# Patient Record
Sex: FEMALE | Race: WHITE
[De-identification: names, ages, dates, MRNs, and addresses within clinical notes are randomized per-mention and may not be internally consistent; named-entity substitution may affect disease eponyms.]

---

## 2017-01-03 ENCOUNTER — HOSPITAL ENCOUNTER (OUTPATIENT)
Age: 82
Discharge: HOME | End: 2017-01-03

## 2017-04-10 ENCOUNTER — HOSPITAL ENCOUNTER (OUTPATIENT)
Age: 82
Discharge: HOME | End: 2017-04-10
Payer: MEDICARE

## 2017-04-10 DIAGNOSIS — C50.911: Primary | ICD-10-CM

## 2018-02-04 ENCOUNTER — HOSPITAL ENCOUNTER (EMERGENCY)
Dept: HOSPITAL 76 - ED | Age: 83
Discharge: HOME | End: 2018-02-04
Payer: MEDICARE

## 2018-02-04 VITALS — SYSTOLIC BLOOD PRESSURE: 133 MMHG | DIASTOLIC BLOOD PRESSURE: 80 MMHG

## 2018-02-04 DIAGNOSIS — I10: ICD-10-CM

## 2018-02-04 DIAGNOSIS — M17.11: Primary | ICD-10-CM

## 2018-02-04 DIAGNOSIS — Z85.3: ICD-10-CM

## 2018-02-04 DIAGNOSIS — Z92.3: ICD-10-CM

## 2018-02-04 DIAGNOSIS — M19.042: ICD-10-CM

## 2018-02-04 PROCEDURE — 99283 EMERGENCY DEPT VISIT LOW MDM: CPT

## 2018-02-04 NOTE — XRAY PRELIMINARY REPORT
Accession: [id number]

Exam: XR HAND 3 VIEW LT

 

IMPRESSION: 

1. No evidence for acute fracture. 

2. First mild carpal metacarpal and minimal interphalangeal osteoarthritis.

 

[facility]

 

SITE ID: [id number]

## 2018-02-04 NOTE — XRAY PRELIMINARY REPORT
Accession: [id number]

Exam: XR KNEE 4 VIEW RT

 

IMPRESSION: 

1. No evidence for acute fracture. 

2. Moderate to severe tricompartment knee degenerative joint disease, most severe at the medial juventino
rtment femoral tibial joint.

 

[facility]

 

SITE ID: [id number]

## 2018-02-04 NOTE — XRAY REPORT
EXAM:

RIGHT KNEE RADIOGRAPHY

 

EXAM DATE: 2/4/2018 04:32 PM.

 

CLINICAL HISTORY: Knee pain. Right knee pain after fall yesterday.

 

COMPARISON: None.

 

TECHNIQUE: 4 views.

 

FINDINGS: 

Bones: No evidence for acute fracture.

 

Joints: Severe medial compartment femoral tibial joint space narrowing with sclerosis and osteophytes
. Moderate lateral compartment femoral tibial osteophytes. Moderate patellofemoral osteophytes. No di
slocation. No knee joint effusion.

 

Soft Tissues: Unremarkable. The femoral and popliteal artery calcification.

 

IMPRESSION: 

1. No evidence for acute fracture. 

2. Moderate to severe tricompartment knee degenerative joint disease, most severe at the medial juventino
rtment femoral tibial joint.

 

[facility]

Referring Provider Line: [phone]

 

SITE ID: [id number]

## 2018-02-04 NOTE — XRAY REPORT
EXAM:

LEFT HAND RADIOGRAPHY

 

EXAM DATE: 2/4/2018 04:32 PM.

 

CLINICAL HISTORY: Hand pain at CMC. Left hand pain, chronic, fell today. Pain at the first CMC joint.


 

COMPARISON: Left wrist 06/11/2009.

 

TECHNIQUE: 3 views.

 

FINDINGS: 

Bones: No evidence for acute fracture.

 

Joints: First mild carpal metacarpal and minimal interphalangeal osteoarthritis.

 

Soft Tissues: Unremarkable.

 

IMPRESSION: 

1. No evidence for acute fracture. 

2. First mild carpal metacarpal and minimal interphalangeal osteoarthritis.

 

[facility]

Referring Provider Line: [phone]

 

SITE ID: [id number]

## 2018-02-04 NOTE — ED PHYSICIAN DOCUMENTATION
PD HPI LOWER EXT INJURY





- Stated complaint


Stated Complaint: KNEE PAIN





- Chief complaint


Chief Complaint: Ext Problem





- History obtained from


History obtained from: Patient





- History of Present Illness


PD HPI LOW EXT INJURY LOCATION: Other (She has a long history of knee problems, 

had multiple surgeries but not for many years.  Her knees were doing okay but 

coming down the stairs today at [employer] she started to feel a pop and tightness 

in the knee and was having difficulty walking, she is pain-free at rest.  She 

also has ongoing pain at the left first CMC for which she is seeing an 

orthopedist in a few days but has not had it x-rayed.)





Review of Systems


Constitutional: reports: Reviewed and negative


Cardiac: reports: Reviewed and negative


Respiratory: reports: Reviewed and negative





PD PAST MEDICAL HISTORY





- Past Medical History


Cardiovascular: Hypertension, High cholesterol


Respiratory: None


Neuro: None


Endocrine/Autoimmune: None


GI: GERD


GYN: Endometriosis, Breast cancer (Status post right lumpectomy and 6 weeks of 

radiation therapy.)


: Frequency


HEENT: Glaucoma


Psych: None


Musculoskeletal: Osteoarthritis


Derm: 12





- Past Surgical History


Past Surgical History: Yes


General: Cholecystectomy, Appendectomy


/GYN: Hysterectomy, Oophrectomy


HEENT: Cataracts





- Present Medications


Home Medications: 


 Ambulatory Orders











 Medication  Instructions  Recorded  Confirmed


 


Amlodipine Besylate 10 mg PO DAILY 05/17/13 06/26/17


 


Calcium Carbonate/Vitamin D3 1 each PO DAILY 05/17/13 06/26/17





[Calcium 600 + D3 Softgel]   


 


Cholecalciferol (Vitamin D3) 1,000 unit PO DAILY 05/17/13 06/26/17





[Vitamin D]   


 


Dextran 70/Hypromellose 1 each EACHEYE DAILY PRN 05/17/13 06/26/17





[Artificial Tears]   


 


Hydrochlorothiazide 25 mg PO DAILY 05/17/13 06/26/17


 


Omeprazole [PriLOSEC] 10 mg PO DAILY 05/17/13 06/26/17


 


Aspirin [Aspirin EC] 650 mg ORAL DAILY 10/13/14 06/26/17


 


Potassium Chloride 10 meq ORAL DAILY 10/13/14 06/26/17


 


Timolol 0.25% Ophth Drops 1 drop EACHEYE BID 10/13/14 06/26/17





[Timoptic 0.25% Ophth Drops]   














- Allergies


Allergies/Adverse Reactions: 


 Allergies











Allergy/AdvReac Type Severity Reaction Status Date / Time


 


sulfamethoxazole Allergy Severe hypotension, Verified 02/04/18 15:45





[From Bactrim]   hives  


 


trimethoprim [From Bactrim] Allergy Severe hypotension, Verified 02/04/18 15:45





   hives  














- Social History


Does the pt smoke?: No


Smoking Status: Never smoker


Does the pt drink ETOH?: No


Does the pt have substance abuse?: No





- Immunizations


Immunizations are current?: Yes





- POLST


Patient has POLST: Yes


POLST Status: DNR





PD ED PE NORMAL





- Vitals


Vital signs reviewed: Yes





- General


General: Alert and oriented X 3, No acute distress





- Extremities


Extremities: Other (Left first CMC is mildly swollen and tender but were not 

warm or red.  The right knee has mild posterior tenderness in the popliteal 

fossa but ligamentous testing is intact, there is no calf pain or tenderness or 

swelling or asymmetry.)





- Neuro


Neuro: Alert and oriented X 3, Normal speech





Results





- Vitals


Vitals: 


 Vital Signs - 24 hr











  02/04/18





  15:44


 


Temperature 36.8 C


 


Heart Rate 97


 


Respiratory 20





Rate 


 


Blood Pressure 133/80 H


 


O2 Saturation 96








 Oxygen











O2 Source                      Room air

















- Rads (name of study)


  ** R knee 4v


Radiology: EMP read contemporaneously (severe OA)





  ** L hand 3v


Radiology: EMP read contemporaneously (OA, janes CMC)





PD MEDICAL DECISION MAKING





- ED course


ED course: 





82-year-old woman with history of knee problems presents with pain in the right 

knee that started while going downstairs today and has significant 

osteoarthritis especially the medial compartment on x-ray.  She declined pain 

medication.  She has an appointment with the orthopedist on Wednesday.





Departure





- Departure


Disposition: 01 Home, Self Care


Clinical Impression: 


 Primary osteoarthritis, left hand





Osteoarthritis of right knee


Qualifiers:


 Osteoarthritis type: primary Qualified Code(s): M17.11 - Unilateral primary 

osteoarthritis, right knee





Condition: Good


Record reviewed to determine appropriate education?: Yes


Instructions:  Osteoarthritis


Comments: 


Follow-up with the orthopedic surgeon on Wednesday as scheduled.  Return if 

worse.  Please let the orthopedic surgeon know that we did x-rays today.  

Tylenol as needed for pain.


Discharge Date/Time: 02/04/18 17:19

## 2018-04-16 ENCOUNTER — HOSPITAL ENCOUNTER (OUTPATIENT)
Dept: HOSPITAL 76 - DI | Age: 83
Discharge: HOME | End: 2018-04-16
Attending: INTERNAL MEDICINE
Payer: MEDICARE

## 2018-04-16 DIAGNOSIS — Z53.9: Primary | ICD-10-CM

## 2018-05-09 ENCOUNTER — HOSPITAL ENCOUNTER (OUTPATIENT)
Dept: HOSPITAL 76 - DI | Age: 83
Discharge: HOME | End: 2018-05-09
Attending: INTERNAL MEDICINE
Payer: MEDICARE

## 2018-05-09 DIAGNOSIS — N63.10: Primary | ICD-10-CM

## 2018-05-09 PROCEDURE — 76642 ULTRASOUND BREAST LIMITED: CPT

## 2018-05-09 PROCEDURE — 77066 DX MAMMO INCL CAD BI: CPT

## 2018-05-09 NOTE — MAMMOGRAPHY REPORT
DIAGNOSTIC BILATERAL MAMMOGRAM:  05/09/2018

 

CLINICAL INDICATION:  Palpable abnormality right lateral breast.

 

TECHNIQUE:  Bilateral CC, MLO view, right true lateral and laterally exaggerated craniocaudal 

views.

 

FINDINGS:  The breasts demonstrate heterogeneously dense fibroglandular parenchyma bilaterally.

 Postoperative and posttreatment changes in the right breast are stable.  Coarse and punctate, 

typically benign calcifications are present.  No mammographic abnormality is appreciated in the

far lateral right breast, at the site of palpable abnormality identified by the patient.  

Please also refer to right breast ultrasound of the same day.

 

IMPRESSION:  BENIGN FINDINGS.

 

RECOMMENDATION:  Routine annual screening unless otherwise clinically indicated.

 

BI-RADS CATEGORY 2 - BENIGN FINDINGS.

 

STANDARD QUALIFYING STATEMENTS:

1.  This examination was reviewed with the aid of Computer-Aided Detection (CAD).

2.  A negative or benign imaging report should not delay biopsy if clinically suspicious 

findings are present.  Consider surgical consultation if warranted.  More than 5% of cancers 

are not identified by imaging.

3.  Dense breasts may obscure an underlying neoplasm.

 

 

DD: 05/09/2018 13:19

TD: 05/09/2018 13:37

Job #: [id number]

## 2021-11-12 ENCOUNTER — HOSPITAL ENCOUNTER (OUTPATIENT)
Dept: HOSPITAL 76 - EMS | Age: 86
Discharge: ANOTHER HOSPITAL | End: 2021-11-12
Payer: MEDICARE

## 2021-11-12 DIAGNOSIS — R07.9: Primary | ICD-10-CM

## 2023-02-14 NOTE — ULTRASOUND REPORT
ULTRASOUND RIGHT BREAST:  05/09/2018

 

CLINICAL INDICATION:  Palpable abnormality.

 

TECHNIQUE:  Real-time scanning was performed with representative static images obtained.

 

FINDINGS:  Ultrasound of the palpable region identified by the patient was performed.  

Unremarkable parenchymal lobules are seen.  No discrete solid or cystic mass is identified.  No

sonographically suspicious findings are seen.

 

IMPRESSION:  NEGATIVE EXAMINATION.

 

RECOMMENDATION:  Routine annual screening unless otherwise clinically indicated.

 

BI-RADS CATEGORY 1 - NEGATIVE.

 

 

DD: 05/09/2018 12:26

TD: 05/09/2018 12:38

Job #: [id number] No